# Patient Record
Sex: MALE | Race: BLACK OR AFRICAN AMERICAN | Employment: UNEMPLOYED | URBAN - METROPOLITAN AREA
[De-identification: names, ages, dates, MRNs, and addresses within clinical notes are randomized per-mention and may not be internally consistent; named-entity substitution may affect disease eponyms.]

---

## 2017-04-30 ENCOUNTER — HOSPITAL ENCOUNTER (EMERGENCY)
Age: 32
Discharge: PSYCHIATRIC OR SUBSTANCE USE FACILITY | End: 2017-05-01
Attending: INTERNAL MEDICINE | Admitting: INTERNAL MEDICINE
Payer: SELF-PAY

## 2017-04-30 DIAGNOSIS — F32.A DEPRESSION, UNSPECIFIED DEPRESSION TYPE: Primary | ICD-10-CM

## 2017-04-30 DIAGNOSIS — R44.0 AUDITORY HALLUCINATIONS: ICD-10-CM

## 2017-04-30 LAB
ALBUMIN SERPL BCP-MCNC: 4.4 G/DL (ref 3.4–5)
ALBUMIN/GLOB SERPL: 1.3 {RATIO} (ref 0.8–1.7)
ALP SERPL-CCNC: 61 U/L (ref 45–117)
ALT SERPL-CCNC: 22 U/L (ref 16–61)
AMPHET UR QL SCN: NEGATIVE
ANION GAP BLD CALC-SCNC: 9 MMOL/L (ref 3–18)
APAP SERPL-MCNC: <2 UG/ML (ref 10–30)
APPEARANCE UR: NORMAL
AST SERPL W P-5'-P-CCNC: 25 U/L (ref 15–37)
BARBITURATES UR QL SCN: NEGATIVE
BASOPHILS # BLD AUTO: 0 K/UL (ref 0–0.06)
BASOPHILS # BLD: 1 % (ref 0–2)
BENZODIAZ UR QL: NEGATIVE
BILIRUB SERPL-MCNC: 0.5 MG/DL (ref 0.2–1)
BILIRUB UR QL: NEGATIVE
BUN SERPL-MCNC: 10 MG/DL (ref 7–18)
BUN/CREAT SERPL: 10 (ref 12–20)
CALCIUM SERPL-MCNC: 9.6 MG/DL (ref 8.5–10.1)
CANNABINOIDS UR QL SCN: POSITIVE
CHLORIDE SERPL-SCNC: 102 MMOL/L (ref 100–108)
CO2 SERPL-SCNC: 32 MMOL/L (ref 21–32)
COCAINE UR QL SCN: NEGATIVE
COLOR UR: YELLOW
CREAT SERPL-MCNC: 1.03 MG/DL (ref 0.6–1.3)
DIFFERENTIAL METHOD BLD: ABNORMAL
EOSINOPHIL # BLD: 0.2 K/UL (ref 0–0.4)
EOSINOPHIL NFR BLD: 3 % (ref 0–5)
ERYTHROCYTE [DISTWIDTH] IN BLOOD BY AUTOMATED COUNT: 13.1 % (ref 11.6–14.5)
ETHANOL SERPL-MCNC: <3 MG/DL (ref 0–3)
GLOBULIN SER CALC-MCNC: 3.3 G/DL (ref 2–4)
GLUCOSE SERPL-MCNC: 88 MG/DL (ref 74–99)
GLUCOSE UR STRIP.AUTO-MCNC: NEGATIVE MG/DL
HCT VFR BLD AUTO: 46.6 % (ref 36–48)
HDSCOM,HDSCOM: ABNORMAL
HGB BLD-MCNC: 16.3 G/DL (ref 13–16)
HGB UR QL STRIP: NEGATIVE
KETONES UR QL STRIP.AUTO: NEGATIVE MG/DL
LEUKOCYTE ESTERASE UR QL STRIP.AUTO: NEGATIVE
LYMPHOCYTES # BLD AUTO: 36 % (ref 21–52)
LYMPHOCYTES # BLD: 1.9 K/UL (ref 0.9–3.6)
MCH RBC QN AUTO: 32.1 PG (ref 24–34)
MCHC RBC AUTO-ENTMCNC: 35 G/DL (ref 31–37)
MCV RBC AUTO: 91.7 FL (ref 74–97)
METHADONE UR QL: NEGATIVE
MONOCYTES # BLD: 0.4 K/UL (ref 0.05–1.2)
MONOCYTES NFR BLD AUTO: 7 % (ref 3–10)
NEUTS SEG # BLD: 2.9 K/UL (ref 1.8–8)
NEUTS SEG NFR BLD AUTO: 53 % (ref 40–73)
NITRITE UR QL STRIP.AUTO: NEGATIVE
OPIATES UR QL: NEGATIVE
PCP UR QL: NEGATIVE
PH UR STRIP: 8 [PH] (ref 5–8)
PLATELET # BLD AUTO: 232 K/UL (ref 135–420)
PMV BLD AUTO: 10.2 FL (ref 9.2–11.8)
POTASSIUM SERPL-SCNC: 3.6 MMOL/L (ref 3.5–5.5)
PROT SERPL-MCNC: 7.7 G/DL (ref 6.4–8.2)
PROT UR STRIP-MCNC: NEGATIVE MG/DL
RBC # BLD AUTO: 5.08 M/UL (ref 4.7–5.5)
SALICYLATES SERPL-MCNC: 2.8 MG/DL (ref 2.8–20)
SODIUM SERPL-SCNC: 143 MMOL/L (ref 136–145)
SP GR UR REFRACTOMETRY: 1.02 (ref 1–1.03)
UROBILINOGEN UR QL STRIP.AUTO: 1 EU/DL (ref 0.2–1)
WBC # BLD AUTO: 5.4 K/UL (ref 4.6–13.2)

## 2017-04-30 PROCEDURE — 80307 DRUG TEST PRSMV CHEM ANLYZR: CPT | Performed by: PHYSICIAN ASSISTANT

## 2017-04-30 PROCEDURE — 81003 URINALYSIS AUTO W/O SCOPE: CPT | Performed by: PHYSICIAN ASSISTANT

## 2017-04-30 PROCEDURE — 85025 COMPLETE CBC W/AUTO DIFF WBC: CPT | Performed by: PHYSICIAN ASSISTANT

## 2017-04-30 PROCEDURE — 99285 EMERGENCY DEPT VISIT HI MDM: CPT

## 2017-04-30 PROCEDURE — 74011250637 HC RX REV CODE- 250/637: Performed by: PHYSICIAN ASSISTANT

## 2017-04-30 PROCEDURE — 80053 COMPREHEN METABOLIC PANEL: CPT | Performed by: PHYSICIAN ASSISTANT

## 2017-04-30 RX ORDER — IBUPROFEN 200 MG
1 TABLET ORAL DAILY
Status: DISCONTINUED | OUTPATIENT
Start: 2017-05-01 | End: 2017-05-01 | Stop reason: HOSPADM

## 2017-04-30 RX ORDER — IBUPROFEN 200 MG
1 TABLET ORAL
Status: COMPLETED | OUTPATIENT
Start: 2017-04-30 | End: 2017-05-01

## 2017-04-30 RX ORDER — IBUPROFEN 200 MG
1 TABLET ORAL DAILY
Status: DISCONTINUED | OUTPATIENT
Start: 2017-04-30 | End: 2017-04-30

## 2017-04-30 RX ORDER — IBUPROFEN 200 MG
1 TABLET ORAL DAILY
Status: DISCONTINUED | OUTPATIENT
Start: 2017-05-01 | End: 2017-04-30

## 2017-04-30 NOTE — ED NOTES
Pt agrees to admission to [facility] crisis stabilization on [address], [place]. Per B, bed will be available tomorrow. Pt is voluntary at this time and contracts to safety while in ED. Plan of care discussed with pt by [facility] from [facility]. Pt agrees to plan. If pt decides that he does not want to go to treatment, please contact [facility] for a TDO. Pt was brought in by Calderon Clark from [place], [phone]. Brother Key Henning is arranging for pt cell phone to be brought to ED and pt belongings to be taken to crisis stabilization tomorrow. Brother Key Henning states that he lives nearby [facility] and if pt needs any emotional support overnight, please call him to come to ED. Awaiting accepting physician information from B. Meal tray ordered. Pt requests to smoke. Informed him that we have a no smoking campus. Nicotine patch requested from GULSHAN Lafleur.

## 2017-04-30 NOTE — ED NOTES
Pt with [employer] members x 3 at bedside. One friend states that pt has been hearing voices. Pt does not deny.

## 2017-04-30 NOTE — ED PROVIDER NOTES
HPI Comments: 2:46 PM  Lew Logan is a 33 y.o. male with PMHx of depression dx a few years ago presenting to the ED C/O auditory hallucinations onset \"a while ago\" but worse over the last few days. Associated sx include depression. Pt states he was brought to the ED today by people at [facility] because he talked with some of the [facility] members about his sx. Pt states the voices he hears are more persistent and more difficult to ignore when he is more depressed. The voices tell him to harm himself sometimes, but more recently, they are telling him to harm others. He states yesterday he thought about \"going to (his) baby mama house, knocking on the door, and asking her or her  if they believe in God, and saying 'tell him hi.' Then I would kill her and then myself. \" He reports starting medication for depression a few years ago that made him nauseous. As a result, he stopped the medication soon after he started it. He also reports h/o multiple suicide attempts including overdosing on medication and attempted electrocution. Pt endorses marijuana use and states he drinks wine occasionally. Pt denies headache, chest pain, blurred vision, SOB, overdose attempts today, and any other symptoms or complaints at this time. The history is provided by the patient. No  was used. No past medical history on file. No past surgical history on file. No family history on file. Social History     Social History    Marital status: SINGLE     Spouse name: N/A    Number of children: N/A    Years of education: N/A     Occupational History    Not on file.      Social History Main Topics    Smoking status: Not on file    Smokeless tobacco: Not on file    Alcohol use Not on file    Drug use: Not on file    Sexual activity: Not on file     Other Topics Concern    Not on file     Social History Narrative         ALLERGIES: Review of patient's allergies indicates no known allergies. Review of Systems   Eyes: Negative for visual disturbance. Respiratory: Negative for shortness of breath. Cardiovascular: Negative for chest pain. Neurological: Negative for headaches. Psychiatric/Behavioral: Positive for dysphoric mood, hallucinations (auditory) and suicidal ideas. All other systems reviewed and are negative. Vitals:    04/30/17 1922 05/01/17 0017 05/01/17 0614 05/01/17 1551   BP: 122/78 109/61 113/67 114/67   Pulse: 80 70 63 73   Resp: 16 16 16 16   Temp: 98 °F (36.7 °C)  98 °F (36.7 °C) 98.4 °F (36.9 °C)   SpO2: 100% 100% 100% 100%   Weight:       Height:                Physical Exam   Constitutional: He is oriented to person, place, and time. He appears well-developed and well-nourished. Alert, oriented x3, depressed    HENT:   Head: Normocephalic and atraumatic. Neck: Normal range of motion. Neck supple. Cardiovascular: Normal rate, regular rhythm, normal heart sounds and intact distal pulses. No murmur heard. Pulmonary/Chest: Effort normal and breath sounds normal. No respiratory distress. He has no wheezes. He has no rales. Abdominal: Soft. Bowel sounds are normal. He exhibits no distension. There is no tenderness. There is no rebound and no guarding. Neurological: He is alert and oriented to person, place, and time. Skin: Skin is warm and dry. Psychiatric: Judgment normal. His affect is blunt. His speech is delayed. He is slowed. He exhibits a depressed mood. He expresses homicidal and suicidal ideation. He expresses suicidal plans and homicidal plans. Slowed speech, withdrawn, will answer questions    Nursing note and vitals reviewed.        RESULTS:    PULSE OXIMETRY NOTE:  Pulse-ox is 99% on room air  Interpretation: Normal       No orders to display        Labs Reviewed   CBC WITH AUTOMATED DIFF - Abnormal; Notable for the following:        Result Value    HGB 16.3 (*)     All other components within normal limits   METABOLIC PANEL, COMPREHENSIVE - Abnormal; Notable for the following:     BUN/Creatinine ratio 10 (*)     All other components within normal limits   DRUG SCREEN, URINE - Abnormal; Notable for the following:     THC (TH-CANNABINOL) POSITIVE (*)     All other components within normal limits   ACETAMINOPHEN - Abnormal; Notable for the following:     ACETAMINOPHEN <2 (*)     All other components within normal limits   URINALYSIS W/ RFLX MICROSCOPIC   ETHYL ALCOHOL   SALICYLATE       No results found for this or any previous visit (from the past 12 hour(s)). MDM  Number of Diagnoses or Management Options  Auditory hallucinations:   Depression, unspecified depression type:      Amount and/or Complexity of Data Reviewed  Clinical lab tests: ordered and reviewed  Discuss the patient with other providers: yes (Federico Leonardo ([address]))      ED Course     MEDICATIONS GIVEN:  Medications   nicotine (NICODERM CQ) 14 mg/24 hr patch 1 Patch (1 Patch TransDERmal Apply Patch 4/30/17 7317)       Procedures    PROGRESS NOTE:  2:46 PM  Initial assessment performed. Recorded by SCOTT Kan, as dictated by Tommie Shea PA-C.    CONSULT NOTE:   3:48 PM   Tommie Shea PA-C spoke with Federico Leonardo   Specialty: [address]  Discussed pt's hx, disposition, and available diagnostic and imaging results over the telephone. Agrees to send a staff member to the ED to evaluate the pt. Written by SCOTT Muhammad, as dictated by Tommie Shea PA-C. PROGRESS NOTE:   4:06 PM  Buster Hence from B is in the ED to evaluate pt. Written by SCOTT Muhammad, as dictated by Tommie Shea PA-C. PROGRESS NOTE:   5:27 PM  B states that pt is going to crisis stabilization. He states he does want help and is voluntary at this point. However, if he changes his mind he will be a TDO. Awaiting bed placement.   Written by SCOTT Muhammad, as dictated by Tommie Shea PA-C.    BEDSIDE TRANSFER OF CARE:  12:00 AM  Patient care will be transferred from Rajani Pa PA-C to Lynne Escamilla MD.  Discussed available diagnostic results and care plan at length at beside. Patient and family made aware of provider change. All questions answered. Patient and family voiced understanding. Written by Carol Gauthier ED Scribe, as dictated by Rajani Pa PA-C.    BEDSIDE TRANSFER OF CARE:  7:30 AM  Patient care will be transferred from Lynne Escamilla MD to Pauline Groves MD.  Discussed available diagnostic results and care plan at length at beside. Patient and family made aware of provider change. All questions answered. Patient and family voiced understanding. Written by SCOTT Potteribe, as dictated by Pauline Groves MD.    TRANSFER PROGRESS NOTE:  3:30 PM  Discussed impending transfer with patient. Patient was instructed that [facility] not have psychiatric services and that patient would be transferred to [facility].  Patient understands and agrees with care plan. Written by SCOTT Potteribe, as dictated by Pauline Groves MD.    CLINICAL IMPRESSION  1. Depression, unspecified depression type    2. Auditory hallucinations        After visit plan:  Transfer to [facility]       This note is prepared by Carol Gauthier, acting as Scribe for Victory Pickle, PA-C. Rajani Pa PA-C: The scribe's documentation has been prepared under my direction and personally reviewed by me in its entirety. I confirm that the note above accurately reflects all work, treatment, procedures, and medical decision making performed by me. This note is prepared by Filomena Brand, acting as Scribe for Pauline Groves MD.    Pauline Groves MD: The scribe's documentation has been prepared under my direction and personally reviewed by me in its entirety.  I confirm that the note above accurately reflects all work, treatment, procedures, and medical decision making performed by me.

## 2017-04-30 NOTE — ED NOTES
Patient greeted / introduced myself as their primary nurse. Patient advised that medical information will be discussed at this time and it is their own responsibility to let nurse know if such conversation should not take place in presence of visitors. Pt expressed understanding. Encouraged to voice any concerns, and all questions/concerns addressed. Assessment in progress. Explanation and teaching of all care given,  including any pending orders or procedures. Awaiting further MD orders at this time. Patient fall risk assessed, with prevention measures in place, to include bed in lowest position with casters locked and rail up, call bell within reach, lights on, pathway to bathroom free from obstacles. Patient instructed to call for assist OOB at all times. Floor dry. Slip resistant socks offered if needed.  Instructed that staff will make hourly rounds to provide reassessments in pain control, concerns, toileting, and any other updates in care. Hand hygiene maintained prior to and after patient/staff interaction. Pt with flat affect, but answers all questions. Pt states that he has been depressed for a long time and reports a suicide attempt when he was a child. Pt states that he threw a playstation in the bathtub with him in an attempt to electrocute himself. Pt states that he has been prescribed medication for depression in the past and tried to overdose with the medication in a suicide attempt. Pt is currently homeless and stays with different friends. Pt states that has worked as a  in the past but is currently unemployed. Pt denies specific plan at this time, but says that he has thought about starvation and dehydration. Pt is approx 110lb and 6'.

## 2017-04-30 NOTE — ED NOTES
Pt is wearing paper scrubs. All personal belongings except for underwear placed in belongings bags. 2 bags labeled with pt name and placed at nurses station. Curtain remains open. ED staff 1:1 supervision in place.

## 2017-04-30 NOTE — ED NOTES
Hourly rounding complete. Safety  Pt resting   [ x ]  On stretcher with side rails up and bed in locked position, call bell within reach  [  ]  Sitting in chair with casters locked, call bell within reach    Toileting  [x  ] pt denies need to use bathroom  [  ] pt assisted to bathroom  [  ] pt assisted with bedpan  [  ] pt independent to bathroom as needed    Ongoing Plan of Care  Plan of care and expected time for test and results reviewed with pt. Pain Management / Comfort  [  ] dimmed lights  [  ] warm blanket provided  [  ] pain assessed  [  ] monitor alarms reviewed    Pt offered a drink, declines at this time. Pt watching tv. Curtain open and 1:1 observation in place by ED staff.

## 2017-04-30 NOTE — ED TRIAGE NOTES
Pt states he has thoughts of harming himself;  Has had these thoughts \"for some time\"   States he feels depressed;   Has attempted \"a long time ago when he was little\"  Not currently on medication

## 2017-04-30 NOTE — ED NOTES
[employer] members brought pt phone to him. Additional clothing brought to ED for pt to take to crisis stab. Bag #3 labeled and placed at nurses station. Pt remains cooperative. Quiet, dull affect.

## 2017-05-01 VITALS
RESPIRATION RATE: 16 BRPM | DIASTOLIC BLOOD PRESSURE: 67 MMHG | HEART RATE: 73 BPM | WEIGHT: 120 LBS | TEMPERATURE: 98.4 F | SYSTOLIC BLOOD PRESSURE: 114 MMHG | BODY MASS INDEX: 16.25 KG/M2 | HEIGHT: 72 IN | OXYGEN SATURATION: 100 %

## 2017-05-01 NOTE — ED NOTES
Received and reviewed discharge instructions, verbalized understanding. No distress at discharge by EMS transport to mental health facility. EMTALA completed and signed by patient.

## 2017-05-01 NOTE — ED NOTES
Report received from off-going shift, patient resting comfortably in bed in no distress. Patient to be admitted to outpatient facility. POC Lion Hensley at [facility].

## 2017-05-01 NOTE — ED NOTES
Bedside report given to [address] [place] [address] (oncoming nurse) from Shon Turpin (outgoing Nurse). Pt updated on plan of care.

## 2017-05-01 NOTE — ED NOTES
Pt requested a bag of chips, at this time a bag of chips was provided. Pt is cooperative and sitter at the bedside.

## 2017-05-01 NOTE — ROUTINE PROCESS
Report called to Francesca Anand RN at [facility]. Accepting MD is [facility]. Patient is aware and in agreement for plan of care.

## 2017-05-01 NOTE — ED NOTES
B called for follow-up of patient placement. [facility] been excepted to [facility]. MD Jeancarlos is excepting [phone] per Chelo Daniel at [facility].

## 2017-05-01 NOTE — ED NOTES
Hourly rounding complete. Safety  Pt resting   [  x]  On stretcher with side rails up and bed in locked position, call bell within reach  [  ]  Sitting in chair with casters locked, call bell within reach    Toileting  [ x ] pt denies need to use bathroom  [  ] pt assisted to bathroom  [  ] pt assisted with bedpan  [  ] pt independent to bathroom as needed    Ongoing Plan of Care  Plan of care and expected time for test and results reviewed with pt. Pain Management / Comfort  [  ] dimmed lights  [  ] warm blanket provided  [ x ] pain assessed  [  ] monitor alarms reviewed    Pt watching television. No needs expressed. Pt remains cooperative, but quiet with flat affect.

## 2017-05-01 NOTE — ED NOTES
Patient resting comfortably in bed, sitter remains at door. Patient in no distress with no complaints at this time.

## 2017-05-01 NOTE — ED NOTES
Assumed care of pt at this time. Pt bedside report received from [address] [place] [address] RN. Pt is resting on stretcher.

## 2017-05-01 NOTE — ED NOTES
Bedside shift change report given to JOSE Martin RN (oncoming nurse) by FILI Matthew (offgoing nurse). Report included the following information SBAR, MAR, and precautions.

## 2020-01-16 ENCOUNTER — HOSPITAL ENCOUNTER (EMERGENCY)
Age: 35
Discharge: HOME | End: 2020-01-16
Attending: EMERGENCY MEDICINE
Payer: MEDICAID

## 2020-01-16 VITALS
RESPIRATION RATE: 18 BRPM | DIASTOLIC BLOOD PRESSURE: 81 MMHG | HEIGHT: 72 IN | TEMPERATURE: 98.5 F | HEART RATE: 86 BPM | SYSTOLIC BLOOD PRESSURE: 143 MMHG | BODY MASS INDEX: 16.25 KG/M2 | WEIGHT: 120 LBS | OXYGEN SATURATION: 100 %

## 2020-01-16 DIAGNOSIS — R00.2 PALPITATIONS: Primary | ICD-10-CM

## 2020-01-16 DIAGNOSIS — F20.9 SCHIZOPHRENIA, UNSPECIFIED TYPE (HCC): ICD-10-CM

## 2020-01-16 PROCEDURE — 93005 ELECTROCARDIOGRAM TRACING: CPT

## 2020-01-16 PROCEDURE — 99284 EMERGENCY DEPT VISIT MOD MDM: CPT

## 2020-01-16 RX ORDER — RISPERIDONE 2 MG/1
3 TABLET, FILM COATED ORAL 2 TIMES DAILY
COMMUNITY
End: 2020-01-16

## 2020-01-16 RX ORDER — TRAZODONE HYDROCHLORIDE 100 MG/1
100 TABLET ORAL
COMMUNITY

## 2020-01-16 RX ORDER — SERTRALINE HYDROCHLORIDE 100 MG/1
150 TABLET, FILM COATED ORAL DAILY
COMMUNITY

## 2020-01-16 RX ORDER — RISPERIDONE 3 MG/1
3 TABLET, FILM COATED ORAL 2 TIMES DAILY
COMMUNITY

## 2020-01-16 NOTE — ED TRIAGE NOTES
Patient reports to ED with c/c of rapid heart rate and anxiety after taking his risperidol this evening.

## 2020-01-16 NOTE — ED PROVIDER NOTES
EMERGENCY DEPARTMENT HISTORY AND PHYSICAL EXAM    Date: 1/16/2020  Patient Name: Beverly Heaton    History of Presenting Illness     Chief Complaint   Patient presents with    Anxiety         History Provided By: Patient    Additional History (Context):     1:45 AM    Beverly Heaton is a 37 y.o. male with pertinent PMHx os schizophrenia, presenting via EMS to the ED c/o palpitations x just PTA in the ED. Pt states that it began a few minutes after taking his Risperidone. Pt notes an associated symptom of anxiety. He took his Zoloft this morning. He has been on both Zoloft and Risperidone for years and denies any recent medication changes. He notes that he normally takes the Risperidone in the afternoon. He did not take his Trazodone tonight. Pt denies any nausea or vomiting. He denies any h/o cardiac disorders. Pt is a smoker. He denies any cough or cold medications. Pt denies any recent marijuana exposure. PCP: None  Pt does not drink EtOH excessively or do illicit drugs. There are no other complaints, changes, or physical findings at this time. Past History     Past Medical History:  Past Medical History:   Diagnosis Date    Schizophrenia [facility])        Past Surgical History:  History reviewed. No pertinent surgical history. Family History:  History reviewed. No pertinent family history. Social History:  Social History     Tobacco Use    Smoking status: Current Every Day Smoker    Smokeless tobacco: Never Used   Substance Use Topics    Alcohol use: Not Currently    Drug use: Not Currently       Allergies:  No Known Allergies      Review of Systems   Review of Systems   Constitutional: Negative for chills and fever. Cardiovascular: Positive for palpitations. Gastrointestinal: Negative for nausea and vomiting. Psychiatric/Behavioral: The patient is nervous/anxious. All other systems reviewed and are negative.       Physical Exam     Vitals:    01/16/20 0148 01/16/20 0157   BP: 143/81 Pulse: 86    Resp: 18    Temp: 98.5 °F (36.9 °C)    SpO2: 100% 100%   Weight: 54.4 kg (120 lb)    Height: 6' (1.829 m)      Physical Exam  Vitals signs and nursing note reviewed. Constitutional:       General: He is not in acute distress. Appearance: He is well-developed. He is not diaphoretic. Comments: Tall, lean, smells of cigarettes     HENT:      Head: Normocephalic and atraumatic. Right Ear: External ear normal.      Left Ear: External ear normal.      Mouth/Throat:      Pharynx: No oropharyngeal exudate. Comments: Poor dentition  Eyes:      General: No scleral icterus. Conjunctiva/sclera: Conjunctivae normal.      Pupils: Pupils are equal, round, and reactive to light. Comments: No pallor   Neck:      Musculoskeletal: Normal range of motion and neck supple. Thyroid: No thyromegaly. Vascular: No JVD. Trachea: No tracheal deviation. Cardiovascular:      Rate and Rhythm: Normal rate and regular rhythm. Heart sounds: Normal heart sounds. Pulmonary:      Effort: Pulmonary effort is normal. No respiratory distress. Breath sounds: Normal breath sounds. No stridor. Abdominal:      General: Bowel sounds are normal. There is no distension. Palpations: Abdomen is soft. Tenderness: There is no tenderness. There is no guarding or rebound. Musculoskeletal: Normal range of motion. General: No tenderness. Comments: No soft tissue injuries   Lymphadenopathy:      Cervical: No cervical adenopathy. Skin:     General: Skin is warm and dry. Findings: No erythema or rash. Neurological:      Mental Status: He is alert and oriented to person, place, and time. Cranial Nerves: No cranial nerve deficit. Coordination: Coordination normal.      Deep Tendon Reflexes: Reflexes are normal and symmetric.  Reflexes normal.      Comments: Nml fine motor coordination   Psychiatric:         Behavior: Behavior normal.         Thought Content: Thought content normal.         Judgment: Judgment normal.      Comments: Psych - delayed speech         Diagnostic Study Results     Labs -     Recent Results (from the past 12 hour(s))   EKG, 12 LEAD, INITIAL    Collection Time: 01/16/20  2:03 AM   Result Value Ref Range    Ventricular Rate 87 BPM    Atrial Rate 87 BPM    P-R Interval 132 ms    QRS Duration 98 ms    Q-T Interval 366 ms    QTC Calculation (Bezet) 440 ms    Calculated P Axis 79 degrees    Calculated R Axis 76 degrees    Calculated T Axis 68 degrees    Diagnosis       Normal sinus rhythm  RSR' or QR pattern in V1 suggests right ventricular conduction delay  ST elevation, consider early repolarization, pericarditis, or injury  Abnormal ECG  No previous ECGs available         Radiologic Studies -   No orders to display         Medical Decision Making   I am the first provider for this patient. I reviewed the vital signs, available nursing notes, past medical history, past surgical history, family history and social history. Vital Signs-Reviewed the patient's vital signs. Pulse Oximetry Analysis - 100% on RA     Cardiac Monitor:  Rate: 73 bpm  Rhythm: NSR    EKG interpretation: (Preliminary)  NSR at 87 bpm. LVH. He does have an RSR prime in V1 & V2. No delta wave or preexcitation. His QTc was 440. EKG read by Mirza Omer. Jyoti Malin MD at [id number]     Records Reviewed: Nursing Notes and Old Medical Records    Provider Notes (Medical Decision Making): He appears delayed, but otherwise without acute psychosis. He appears grounded in reality, but with perceived cardiac irregularity. His rate is nml here. Will check EKG and if clear of preexcitation, Delta wave, or abnormal QTc; should be appropriate for discharge. PROCEDURES:  Procedures    MEDICATIONS GIVEN IN THE ED:  Medications - No data to display     ED COURSE:   1:45 AM   Initial assessment performed. PROGRESS NOTE:  2:35 AM  Pt and/or family have been updated on their results.  Pt and/or pt's family are aware of the plan of care and are in agreement. Written by Capri Rashid ED Scribe, as dictated by Christie Medina MD.      Diagnosis and Disposition       DISCHARGE NOTE:  2:35 AM   The patient is ready for discharge. The patient's signs, symptoms, diagnosis, and discharge instructions have been discussed and the patient and/or family has conveyed their understanding. The patient and/or family is to follow up as recommended or return to the ER should their symptoms worsen. Plan has been discussed and the patient and/or family is in agreement. Written by SCOTT Pierceibe, as dictated by Christie Medina MD.     CLINICAL IMPRESSION:  1. Palpitations    2. Schizophrenia, unspecified type ([address]        PLAN:  1. D/C Home  2. Current Discharge Medication List        3. Follow-up Information     Follow up With Specialties Details Why Contact Info    [facility]  Schedule an appointment as soon as possible for a visit for primary care follow up [address] [facility], [address]    [facility] EMERGENCY DEPT Emergency Medicine  As needed, If symptoms worsen [address] [place] [address]  [phone]        _______________________________    Attestations: This note is prepared by Ramo Palmer, acting as Scribe for Kwaku. Feli Medina MD.    [employer]. Feli Medina MD:  The scribe's documentation has been prepared under my direction and personally reviewed by me in its entirety.  I confirm that the note above accurately reflects all work, treatment, procedures, and medical decision making performed by me.  _______________________________

## 2020-01-16 NOTE — DISCHARGE INSTRUCTIONS

## 2020-01-21 LAB
ATRIAL RATE: 87 BPM
CALCULATED P AXIS, ECG09: 79 DEGREES
CALCULATED R AXIS, ECG10: 76 DEGREES
CALCULATED T AXIS, ECG11: 68 DEGREES
DIAGNOSIS, 93000: NORMAL
P-R INTERVAL, ECG05: 132 MS
Q-T INTERVAL, ECG07: 366 MS
QRS DURATION, ECG06: 98 MS
QTC CALCULATION (BEZET), ECG08: 440 MS
VENTRICULAR RATE, ECG03: 87 BPM